# Patient Record
Sex: MALE | Race: WHITE | NOT HISPANIC OR LATINO | URBAN - METROPOLITAN AREA
[De-identification: names, ages, dates, MRNs, and addresses within clinical notes are randomized per-mention and may not be internally consistent; named-entity substitution may affect disease eponyms.]

---

## 2020-06-01 ENCOUNTER — OFFICE VISIT (OUTPATIENT)
Dept: URBAN - METROPOLITAN AREA CLINIC 113 | Facility: CLINIC | Age: 35
End: 2020-06-01

## 2020-07-13 ENCOUNTER — OFFICE VISIT (OUTPATIENT)
Dept: URBAN - METROPOLITAN AREA CLINIC 113 | Facility: CLINIC | Age: 35
End: 2020-07-13

## 2020-07-25 ENCOUNTER — TELEPHONE ENCOUNTER (OUTPATIENT)
Dept: URBAN - METROPOLITAN AREA CLINIC 13 | Facility: CLINIC | Age: 35
End: 2020-07-25

## 2020-07-25 RX ORDER — CELECOXIB 200 MG/1
TAKE 1 CAPSULE DAILY WITH A MEAL CAPSULE ORAL
Refills: 0 | OUTPATIENT
End: 2020-07-13

## 2020-07-26 ENCOUNTER — TELEPHONE ENCOUNTER (OUTPATIENT)
Dept: URBAN - METROPOLITAN AREA CLINIC 13 | Facility: CLINIC | Age: 35
End: 2020-07-26

## 2020-07-26 RX ORDER — APIXABAN 5 MG/1
TAKE 1 TABLET TWICE DAILY TABLET, FILM COATED ORAL
Refills: 0 | Status: ACTIVE | COMMUNITY

## 2020-07-26 RX ORDER — LISINOPRIL 20 MG/1
TAKE 1 TABLET DAILY TABLET ORAL
Refills: 0 | Status: ACTIVE | COMMUNITY

## 2020-09-14 ENCOUNTER — OFFICE VISIT (OUTPATIENT)
Dept: URBAN - METROPOLITAN AREA CLINIC 113 | Facility: CLINIC | Age: 35
End: 2020-09-14

## 2020-09-14 PROBLEM — 235942001: Status: ACTIVE | Noted: 2020-09-14

## 2020-09-14 RX ORDER — LISINOPRIL 20 MG/1
TAKE 1 TABLET DAILY TABLET ORAL
Refills: 0 | Status: ACTIVE | COMMUNITY

## 2020-09-14 RX ORDER — APIXABAN 5 MG/1
TAKE 1 TABLET TWICE DAILY TABLET, FILM COATED ORAL
Refills: 0 | Status: ACTIVE | COMMUNITY

## 2020-09-14 NOTE — HPI-OTHER HISTORIES
7/13/2020 34-year-old male presenting for follow-up after hospitalization. He was initially seen in the hospital by myself on April 22, 2020. He was seen in consultation for acute pancreatitis. He was found to have slightly elevated triglycerides above 500. He was treated with TriCor for this. 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 He was started on anticoagulation and transitioned over to Eliquis at discharge. Since discharge he has had recurrent pneumonia and was seen in consultation by Dr. Newton (pulmonologist). 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 He has had subsequent chest x-ray performed on May 26, 2020 which was unremarkable. He is here today for follow-up. He is still on Eliquis and Lipitor. He has been having epigastric abdominal pain with associated nausea and vomiting. He also has a decreased ability to tolerate much food. Since discharge she has had recurrent pneumonia